# Patient Record
Sex: FEMALE | Race: WHITE
[De-identification: names, ages, dates, MRNs, and addresses within clinical notes are randomized per-mention and may not be internally consistent; named-entity substitution may affect disease eponyms.]

---

## 2019-01-16 ENCOUNTER — HOSPITAL ENCOUNTER (OUTPATIENT)
Dept: HOSPITAL 74 - FASU | Age: 49
Discharge: HOME | End: 2019-01-16
Attending: ORTHOPAEDIC SURGERY
Payer: COMMERCIAL

## 2019-01-16 VITALS — HEART RATE: 67 BPM | DIASTOLIC BLOOD PRESSURE: 85 MMHG | SYSTOLIC BLOOD PRESSURE: 138 MMHG

## 2019-01-16 VITALS — BODY MASS INDEX: 25.3 KG/M2

## 2019-01-16 VITALS — TEMPERATURE: 98.1 F

## 2019-01-16 DIAGNOSIS — D21.11: Primary | ICD-10-CM

## 2019-01-16 PROCEDURE — 0JBG0ZZ EXCISION OF RIGHT LOWER ARM SUBCUTANEOUS TISSUE AND FASCIA, OPEN APPROACH: ICD-10-PCS | Performed by: ORTHOPAEDIC SURGERY

## 2019-01-17 NOTE — OP
DATE OF OPERATION:  1/16/2019

 

PREOPERATIVE DIAGNOSIS:  Right dorsal wrist mass.

 

POSTOPERATIVE DIAGNOSIS:  Right dorsal wrist mass.

 

OPERATIVE PROCEDURE:  Right dorsal wrist mass excision.  

 

SURGEON:  Juan Sosa MD 

 

ASSISTANT:  RITESH Adair

 

ANESTHESIA:  General.

 

COMPLICATIONS:  None.  

 

ESTIMATED BLOOD LOSS:  Minimal. 

 

INDICATION FOR PROCEDURE:  The patient is a 48-year-old female with the above 
finding

indicated for operative treatment.  Risks, benefits, alternatives were 
discussed with

the patient at length.  Proper informed consent was obtained.  

 

DESCRIPTION OF PROCEDURE:  After proper identification of the patient, correct

operative site, patient brought to the operating room, placed supine on the 
operating

table with all prominences well padded.  General anesthesia was given.  Timeout

procedure was performed.  Right upper extremity was prepped and draped in usual

sterile fashion.  Esmarch bandage used to exsanguinate right upper extremity. 

Tourniquet was inflated to 250 mmHg.  Longitudinal incision was made over the 
dorsal

aspect of the wrist where the mass was present, which measured approximately 3 
cm x 3

cm.  Skin was incised, and blunt and sharp dissection was performed through the 
soft

tissue.  The mass was found to be a well-circumscribed mass superficial to the

extensor tendons and retinaculum but deep to the subcutaneous tissue.   It was 
not significantly adherent to any tissue.  It was resected free of the adjacent 
soft tissue and sent for

pathological evaluation.  This was a solid mass.  No further mass was present.  
Wound

was irrigated and repaired with 4-0 Vicryl and 4-0 Monocryl sutures.  Steri-
Strips

and a sterile dressings were applied.  Patient was reversed from anesthesia and

brought to the recovery room in stable condition.  She tolerated the procedure 
well. 

 

 

 

JUAN SOSA M.D.

 

[id number]

DD: 01/17/2019 08:16

DT: 01/17/2019 08:51

Job #:  [id number]

MTDD

## 2019-01-28 NOTE — PATH
Surgical Pathology Report



Patient Name:  OH PACE

Accession #:  [id number]

Med. Rec. #:  [id number]                                                        

   /Age/Gender:  1970 (Age: 48) / F

Account:  [id number]                                                          

             Location: [facility] AMBULATORY 

Taken:  2019

Received:  2019

Reported:  2019

Physicians:  Juan Padron M.D.

  



Specimen(s) Received

 MASS OF RIGHT WRIST 





Clinical History

Right wrist mass







Final Diagnosis

WRIST MASS, RIGHT, EXCISION:

MYXOFIBROSARCOMA, HIGH GRADE.

TUMOR MEASURES 3.0 x 2.3 x 2.0 CM, GROSS MEASUREMENT.

NO LYMPHOVASCULAR INVASION IDENTIFIED.

SURGICAL MARGINS ARE UNINVOLVED; SHELLED OUT WITH NARROW MARGINS (<1 MM).



Comment: The tumor appears well-circumscribed by a thin fibrous pseudocapsule

and shows a predominantly, >90%, myxoid component. The tumor is composed of

spindle and pleomorphic cells with scant eosinophilic cytoplasm and

hyperchromatic nuclei. A rich curvilinear vascular network is seen throughout.

Focal areas of more solid (non-myxoid) growth are present. No significant

necrosis identified. Based on these latter areas and the presence of marked

nuclear pleomorphism, the lesion is best classified as high grade. 



Immunohistochemical stains performed at [place], [place] are

positive for vimentin, and CD34; while negative for S100, AE1/3 and desmin.

 was utilized to evaluate this case.



This case was sent to Dr. Shira Franks from [facility]

[facility], [place], [place] (#: [phone]); the diagnosis above reflects her

opinion. Office of Dr. Padron informed that significant findings will be faxed

(Binta Castillo). 





***Electronically Signed***

Raiza Nicolas M.D.





Gross Description

Received in formalin labeled "right wrist mass," is a 3.0 x 2.3 x 2.0 cm tan,

ovoid, intact, encapsulated mass. The outer surface is tan grey and smooth.

Sectioning reveals homogeneous tan, solid parenchyma. Representative sections

are submitted in 4 cassettes.

DL/2019



saudi